# Patient Record
Sex: FEMALE | Race: WHITE | NOT HISPANIC OR LATINO | Employment: FULL TIME | URBAN - METROPOLITAN AREA
[De-identification: names, ages, dates, MRNs, and addresses within clinical notes are randomized per-mention and may not be internally consistent; named-entity substitution may affect disease eponyms.]

---

## 2017-02-03 RX ORDER — LABETALOL 200 MG/1
TABLET, FILM COATED ORAL
Qty: 60 TABLET | Refills: 0 | Status: SHIPPED | OUTPATIENT
Start: 2017-02-03 | End: 2017-03-09 | Stop reason: SDUPTHER

## 2017-03-09 RX ORDER — LABETALOL 200 MG/1
TABLET, FILM COATED ORAL
Qty: 30 TABLET | Refills: 0 | Status: SHIPPED | OUTPATIENT
Start: 2017-03-09 | End: 2017-04-18 | Stop reason: SDUPTHER

## 2017-03-30 RX ORDER — LABETALOL 200 MG/1
TABLET, FILM COATED ORAL
Qty: 30 TABLET | Refills: 0 | Status: SHIPPED | OUTPATIENT
Start: 2017-03-30 | End: 2017-04-17 | Stop reason: SDUPTHER

## 2017-04-17 RX ORDER — LABETALOL 200 MG/1
TABLET, FILM COATED ORAL
Qty: 30 TABLET | Refills: 0 | Status: SHIPPED | OUTPATIENT
Start: 2017-04-17 | End: 2017-04-18 | Stop reason: SDUPTHER

## 2017-04-18 RX ORDER — LABETALOL 200 MG/1
TABLET, FILM COATED ORAL
Qty: 21 TABLET | Refills: 0 | Status: SHIPPED | OUTPATIENT
Start: 2017-04-18 | End: 2017-05-01 | Stop reason: SDUPTHER

## 2017-05-01 RX ORDER — LABETALOL 200 MG/1
200 TABLET, FILM COATED ORAL 2 TIMES DAILY
Qty: 60 TABLET | Refills: 0 | Status: SHIPPED | OUTPATIENT
Start: 2017-05-01 | End: 2017-05-30 | Stop reason: SDUPTHER

## 2017-05-30 ENCOUNTER — OFFICE VISIT (OUTPATIENT)
Dept: FAMILY MEDICINE CLINIC | Facility: CLINIC | Age: 52
End: 2017-05-30

## 2017-05-30 VITALS
SYSTOLIC BLOOD PRESSURE: 160 MMHG | DIASTOLIC BLOOD PRESSURE: 98 MMHG | WEIGHT: 178 LBS | HEIGHT: 63 IN | TEMPERATURE: 98.3 F | HEART RATE: 77 BPM | BODY MASS INDEX: 31.54 KG/M2 | OXYGEN SATURATION: 98 %

## 2017-05-30 DIAGNOSIS — E87.6 HYPOKALEMIA: ICD-10-CM

## 2017-05-30 DIAGNOSIS — R79.89 LFT ELEVATION: ICD-10-CM

## 2017-05-30 DIAGNOSIS — I10 HYPERTENSION, ESSENTIAL: Primary | ICD-10-CM

## 2017-05-30 LAB
ALBUMIN SERPL-MCNC: 4.6 G/DL (ref 3.5–5.2)
ALBUMIN/GLOB SERPL: 1.5 G/DL
ALP SERPL-CCNC: 58 U/L (ref 39–117)
ALT SERPL W P-5'-P-CCNC: 20 U/L (ref 1–33)
ANION GAP SERPL CALCULATED.3IONS-SCNC: 15.6 MMOL/L
AST SERPL-CCNC: 44 U/L (ref 1–32)
BILIRUB SERPL-MCNC: 0.5 MG/DL (ref 0.1–1.2)
BUN BLD-MCNC: 12 MG/DL (ref 6–20)
BUN/CREAT SERPL: 16 (ref 7–25)
CALCIUM SPEC-SCNC: 9.9 MG/DL (ref 8.6–10.5)
CHLORIDE SERPL-SCNC: 102 MMOL/L (ref 98–107)
CO2 SERPL-SCNC: 22.4 MMOL/L (ref 22–29)
CREAT BLD-MCNC: 0.75 MG/DL (ref 0.57–1)
GFR SERPL CREATININE-BSD FRML MDRD: 81 ML/MIN/1.73
GLOBULIN UR ELPH-MCNC: 3.1 GM/DL
GLUCOSE BLD-MCNC: 100 MG/DL (ref 65–99)
HAV IGM SERPL QL IA: NORMAL
HBV CORE IGM SERPL QL IA: NORMAL
HBV SURFACE AG SERPL QL IA: NORMAL
HCV AB SER DONR QL: NORMAL
POTASSIUM BLD-SCNC: 4.4 MMOL/L (ref 3.5–5.2)
PROT SERPL-MCNC: 7.7 G/DL (ref 6–8.5)
SODIUM BLD-SCNC: 140 MMOL/L (ref 136–145)

## 2017-05-30 PROCEDURE — 99213 OFFICE O/P EST LOW 20 MIN: CPT | Performed by: INTERNAL MEDICINE

## 2017-05-30 PROCEDURE — 80074 ACUTE HEPATITIS PANEL: CPT | Performed by: INTERNAL MEDICINE

## 2017-05-30 PROCEDURE — 80053 COMPREHEN METABOLIC PANEL: CPT | Performed by: INTERNAL MEDICINE

## 2017-05-30 RX ORDER — NIFEDIPINE 30 MG/1
30 TABLET, EXTENDED RELEASE ORAL DAILY
Qty: 30 TABLET | Refills: 2 | Status: SHIPPED | OUTPATIENT
Start: 2017-05-30 | End: 2017-08-21 | Stop reason: SDUPTHER

## 2017-05-30 RX ORDER — LABETALOL 200 MG/1
200 TABLET, FILM COATED ORAL 2 TIMES DAILY
Qty: 180 TABLET | Refills: 3 | Status: SHIPPED | OUTPATIENT
Start: 2017-05-30 | End: 2018-04-14 | Stop reason: SDUPTHER

## 2017-08-21 RX ORDER — NIFEDIPINE 30 MG/1
TABLET, EXTENDED RELEASE ORAL
Qty: 30 TABLET | Refills: 0 | Status: SHIPPED | OUTPATIENT
Start: 2017-08-21 | End: 2017-09-15 | Stop reason: SDUPTHER

## 2017-09-15 RX ORDER — NIFEDIPINE 30 MG/1
TABLET, EXTENDED RELEASE ORAL
Qty: 30 TABLET | Refills: 0 | Status: SHIPPED | OUTPATIENT
Start: 2017-09-15 | End: 2017-10-09 | Stop reason: SDUPTHER

## 2017-10-09 RX ORDER — NIFEDIPINE 30 MG/1
TABLET, EXTENDED RELEASE ORAL
Qty: 30 TABLET | Refills: 0 | Status: SHIPPED | OUTPATIENT
Start: 2017-10-09 | End: 2017-11-06 | Stop reason: SDUPTHER

## 2017-11-06 RX ORDER — NIFEDIPINE 30 MG/1
TABLET, EXTENDED RELEASE ORAL
Qty: 30 TABLET | Refills: 0 | Status: SHIPPED | OUTPATIENT
Start: 2017-11-06 | End: 2017-11-30 | Stop reason: SDUPTHER

## 2017-11-30 RX ORDER — NIFEDIPINE 30 MG/1
TABLET, EXTENDED RELEASE ORAL
Qty: 30 TABLET | Refills: 0 | Status: SHIPPED | OUTPATIENT
Start: 2017-11-30 | End: 2017-12-30 | Stop reason: SDUPTHER

## 2018-01-02 RX ORDER — NIFEDIPINE 30 MG/1
TABLET, EXTENDED RELEASE ORAL
Qty: 30 TABLET | Refills: 0 | Status: SHIPPED | OUTPATIENT
Start: 2018-01-02 | End: 2018-02-05 | Stop reason: SDUPTHER

## 2018-02-05 RX ORDER — NIFEDIPINE 30 MG/1
TABLET, EXTENDED RELEASE ORAL
Qty: 30 TABLET | Refills: 0 | Status: SHIPPED | OUTPATIENT
Start: 2018-02-05 | End: 2018-10-17 | Stop reason: SDUPTHER

## 2018-04-16 RX ORDER — LABETALOL 200 MG/1
TABLET, FILM COATED ORAL
Qty: 180 TABLET | Refills: 0 | Status: SHIPPED | OUTPATIENT
Start: 2018-04-16 | End: 2018-07-12 | Stop reason: SDUPTHER

## 2018-07-12 RX ORDER — LABETALOL 200 MG/1
TABLET, FILM COATED ORAL
Qty: 180 TABLET | Refills: 0 | Status: SHIPPED | OUTPATIENT
Start: 2018-07-12 | End: 2018-10-05 | Stop reason: SDUPTHER

## 2018-10-05 RX ORDER — LABETALOL 200 MG/1
TABLET, FILM COATED ORAL
Qty: 14 TABLET | Refills: 0 | Status: SHIPPED | OUTPATIENT
Start: 2018-10-05 | End: 2018-10-15 | Stop reason: SDUPTHER

## 2018-10-15 ENCOUNTER — TELEPHONE (OUTPATIENT)
Dept: FAMILY MEDICINE CLINIC | Facility: CLINIC | Age: 53
End: 2018-10-15

## 2018-10-15 RX ORDER — LABETALOL 200 MG/1
200 TABLET, FILM COATED ORAL 2 TIMES DAILY
Qty: 30 TABLET | Refills: 0 | Status: SHIPPED | OUTPATIENT
Start: 2018-10-15 | End: 2018-10-17 | Stop reason: SDUPTHER

## 2018-10-15 NOTE — TELEPHONE ENCOUNTER
PT HAS AN APPOINTMENT TO [facility] THIS Wednesday AND SHE IS COMPLETELY OUT OF HER B/P MEDICATION. SHE IS ASKING IF SHE CAN JUST GET A COUPLE TABLETS WORTH SENT TO HER PHARMACY TO GET HER THROUGH UNTIL HER APPOINTMENT?

## 2018-10-17 ENCOUNTER — OFFICE VISIT (OUTPATIENT)
Dept: FAMILY MEDICINE CLINIC | Facility: CLINIC | Age: 53
End: 2018-10-17

## 2018-10-17 ENCOUNTER — TELEPHONE (OUTPATIENT)
Dept: FAMILY MEDICINE CLINIC | Facility: CLINIC | Age: 53
End: 2018-10-17

## 2018-10-17 VITALS
TEMPERATURE: 98.6 F | HEIGHT: 63 IN | HEART RATE: 70 BPM | WEIGHT: 171 LBS | BODY MASS INDEX: 30.3 KG/M2 | OXYGEN SATURATION: 98 % | SYSTOLIC BLOOD PRESSURE: 152 MMHG | DIASTOLIC BLOOD PRESSURE: 90 MMHG

## 2018-10-17 DIAGNOSIS — E66.09 CLASS 1 OBESITY DUE TO EXCESS CALORIES WITH SERIOUS COMORBIDITY AND BODY MASS INDEX (BMI) OF 30.0 TO 30.9 IN ADULT: ICD-10-CM

## 2018-10-17 DIAGNOSIS — I10 BENIGN ESSENTIAL HTN: Primary | ICD-10-CM

## 2018-10-17 DIAGNOSIS — Z72.0 TOBACCO ABUSE: ICD-10-CM

## 2018-10-17 DIAGNOSIS — Z12.39 BREAST CANCER SCREENING: ICD-10-CM

## 2018-10-17 DIAGNOSIS — R73.9 HYPERGLYCEMIA: Primary | ICD-10-CM

## 2018-10-17 DIAGNOSIS — R74.8 ELEVATED LIVER ENZYMES: ICD-10-CM

## 2018-10-17 DIAGNOSIS — Z12.11 COLON CANCER SCREENING: ICD-10-CM

## 2018-10-17 PROBLEM — R79.89 ELEVATED LFTS: Status: ACTIVE | Noted: 2017-03-28

## 2018-10-17 LAB
ALBUMIN SERPL-MCNC: 4.4 G/DL (ref 3.5–5.2)
ALBUMIN/GLOB SERPL: 1.4 G/DL
ALP SERPL-CCNC: 58 U/L (ref 39–117)
ALT SERPL W P-5'-P-CCNC: 60 U/L (ref 1–33)
ANION GAP SERPL CALCULATED.3IONS-SCNC: 15 MMOL/L
AST SERPL-CCNC: 76 U/L (ref 1–32)
BACTERIA UR QL AUTO: ABNORMAL /HPF
BILIRUB SERPL-MCNC: 0.4 MG/DL (ref 0.1–1.2)
BILIRUB UR QL STRIP: ABNORMAL
BUN BLD-MCNC: 6 MG/DL (ref 6–20)
BUN/CREAT SERPL: 9.8 (ref 7–25)
CALCIUM SPEC-SCNC: 9.8 MG/DL (ref 8.6–10.5)
CHLORIDE SERPL-SCNC: 101 MMOL/L (ref 98–107)
CHOLEST SERPL-MCNC: 259 MG/DL (ref 0–200)
CLARITY UR: CLEAR
CO2 SERPL-SCNC: 25 MMOL/L (ref 22–29)
COLOR UR: YELLOW
CREAT BLD-MCNC: 0.61 MG/DL (ref 0.57–1)
ERYTHROCYTE [DISTWIDTH] IN BLOOD BY AUTOMATED COUNT: 12.8 % (ref 4.5–15)
GFR SERPL CREATININE-BSD FRML MDRD: 102 ML/MIN/1.73
GLOBULIN UR ELPH-MCNC: 3.2 GM/DL
GLUCOSE BLD-MCNC: 114 MG/DL (ref 65–99)
GLUCOSE UR STRIP-MCNC: NEGATIVE MG/DL
HBA1C MFR BLD: 5.36 % (ref 4.8–5.6)
HCT VFR BLD AUTO: 43.6 % (ref 31–42)
HDLC SERPL-MCNC: 32 MG/DL (ref 40–60)
HGB BLD-MCNC: 14.3 G/DL (ref 12–18)
HGB UR QL STRIP.AUTO: NEGATIVE
KETONES UR QL STRIP: NEGATIVE
LDLC SERPL CALC-MCNC: 182 MG/DL (ref 0–100)
LDLC/HDLC SERPL: 5.68 {RATIO}
LEUKOCYTE ESTERASE UR QL STRIP.AUTO: ABNORMAL
LYMPHOCYTES # BLD AUTO: 1.8 10*3/MM3 (ref 1.2–3.4)
LYMPHOCYTES NFR BLD AUTO: 30.6 % (ref 21–51)
MCH RBC QN AUTO: 33.9 PG (ref 26.1–33.1)
MCHC RBC AUTO-ENTMCNC: 32.7 G/DL (ref 33–37)
MCV RBC AUTO: 103.7 FL (ref 80–99)
MONOCYTES # BLD AUTO: 0.4 10*3/MM3 (ref 0.1–0.6)
MONOCYTES NFR BLD AUTO: 6 % (ref 2–9)
NEUTROPHILS # BLD AUTO: 3.8 10*3/MM3 (ref 1.4–6.5)
NEUTROPHILS NFR BLD AUTO: 63.4 % (ref 42–75)
NITRITE UR QL STRIP: NEGATIVE
PH UR STRIP.AUTO: 6.5 [PH] (ref 4.6–8)
PLATELET # BLD AUTO: 249 10*3/MM3 (ref 150–450)
PMV BLD AUTO: 7.8 FL (ref 7.1–10.5)
POTASSIUM BLD-SCNC: 3.6 MMOL/L (ref 3.5–5.2)
PROT SERPL-MCNC: 7.6 G/DL (ref 6–8.5)
PROT UR QL STRIP: ABNORMAL
RBC # BLD AUTO: 4.21 10*6/MM3 (ref 4–6)
RBC # UR: ABNORMAL /HPF
REF LAB TEST METHOD: ABNORMAL
SODIUM BLD-SCNC: 141 MMOL/L (ref 136–145)
SP GR UR STRIP: 1.02 (ref 1–1.03)
SQUAMOUS #/AREA URNS HPF: ABNORMAL /HPF
TRIGL SERPL-MCNC: 226 MG/DL (ref 0–150)
TSH SERPL DL<=0.05 MIU/L-ACNC: 4.04 MIU/ML (ref 0.27–4.2)
UROBILINOGEN UR QL STRIP: ABNORMAL
VLDLC SERPL-MCNC: 45.2 MG/DL (ref 5–40)
WBC NRBC COR # BLD: 6 10*3/MM3 (ref 4.5–10)
WBC UR QL AUTO: ABNORMAL /HPF

## 2018-10-17 PROCEDURE — 81001 URINALYSIS AUTO W/SCOPE: CPT | Performed by: NURSE PRACTITIONER

## 2018-10-17 PROCEDURE — 83036 HEMOGLOBIN GLYCOSYLATED A1C: CPT | Performed by: NURSE PRACTITIONER

## 2018-10-17 PROCEDURE — 80061 LIPID PANEL: CPT | Performed by: NURSE PRACTITIONER

## 2018-10-17 PROCEDURE — 36415 COLL VENOUS BLD VENIPUNCTURE: CPT | Performed by: NURSE PRACTITIONER

## 2018-10-17 PROCEDURE — 80053 COMPREHEN METABOLIC PANEL: CPT | Performed by: NURSE PRACTITIONER

## 2018-10-17 PROCEDURE — 85025 COMPLETE CBC W/AUTO DIFF WBC: CPT | Performed by: NURSE PRACTITIONER

## 2018-10-17 PROCEDURE — 99214 OFFICE O/P EST MOD 30 MIN: CPT | Performed by: NURSE PRACTITIONER

## 2018-10-17 PROCEDURE — 84443 ASSAY THYROID STIM HORMONE: CPT | Performed by: NURSE PRACTITIONER

## 2018-10-17 RX ORDER — LABETALOL 200 MG/1
200 TABLET, FILM COATED ORAL 2 TIMES DAILY
Qty: 180 TABLET | Refills: 1 | Status: SHIPPED | OUTPATIENT
Start: 2018-10-17 | End: 2019-04-16 | Stop reason: SDUPTHER

## 2018-10-17 RX ORDER — NIFEDIPINE 30 MG/1
30 TABLET, EXTENDED RELEASE ORAL DAILY
Qty: 90 TABLET | Refills: 1 | Status: SHIPPED | OUTPATIENT
Start: 2018-10-17 | End: 2019-04-16 | Stop reason: SDUPTHER

## 2018-10-17 RX ORDER — ATORVASTATIN CALCIUM 10 MG/1
10 TABLET, FILM COATED ORAL NIGHTLY
Qty: 30 TABLET | Refills: 5 | Status: SHIPPED | OUTPATIENT
Start: 2018-10-17 | End: 2020-10-07 | Stop reason: HOSPADM

## 2018-10-17 RX ORDER — AMOXICILLIN 500 MG
CAPSULE ORAL
COMMUNITY

## 2018-10-17 NOTE — PATIENT INSTRUCTIONS
check labs and call with results, refill labetalol and add nifedipine XL 30 mg daily and call with BP log in 1-2 wks, enc smoking cessation, refer overdue colonoscopy and mammo, RTC [facility] annual CBE pap and pelvic here, Enc healthy diet and regular exercise for wt loss

## 2018-10-17 NOTE — TELEPHONE ENCOUNTER
BS elevated but no DM or prediabetes.Liver enzymes are elevated, we can recheck in 1 mo lab only and if still high consider liver US. Kidney and thyroid normal. Chol elevated and recommend lipitor 10 mg HS erx to [facility] and recheck chol fasting 3-6 mo. Enc smoking cessation and low chol diet and exercise

## 2018-10-17 NOTE — PROGRESS NOTES
Subjective   Charlette Silver is a 53 y.o. female.     History of Present Illness   Here to FU on chronic uncontrolled HTN on labetalol 200 mg BID and prev took nifedipine XL 30 mg daily but has been off, > 6 mo no CP dizziness HA LE edema, checks BP at home runs similar 140-150s/90s, with hx of low potassium now taking OTC, last labs 05/17 elevated ALT neg hepatitis panel, stopped drinking alcohol but still smoking 1/2 ppd but trying to decrease smoking and stop  Last mammo and pap many years ago, no breast pain, lumps, nipple dc, never had colonoscopy no bowel sx    The following portions of the patient's history were reviewed and updated as appropriate: allergies, current medications, past family history, past medical history, past social history, past surgical history and problem list.    Review of Systems   Constitutional: Negative for fever.   Respiratory: Negative for cough, shortness of breath and wheezing.    Cardiovascular: Negative for chest pain, palpitations and leg swelling.   Gastrointestinal: Negative for abdominal distention, abdominal pain, anal bleeding, blood in stool, constipation, diarrhea, nausea, rectal pain and vomiting.   Neurological: Negative for dizziness and headaches.   All other systems reviewed and are negative.      Objective   Physical Exam   Constitutional: She is oriented to person, place, and time. She appears well-developed and well-nourished.   HENT:   Head: Normocephalic and atraumatic.   Eyes: Pupils are equal, round, and reactive to light. Conjunctivae and EOM are normal.   Cardiovascular: Normal rate, regular rhythm and normal heart sounds.    Pulmonary/Chest: Effort normal and breath sounds normal.   Musculoskeletal: Normal range of motion. She exhibits no edema (B LE).   Neurological: She is alert and oriented to person, place, and time.   Skin: Skin is warm and dry.   Psychiatric: She has a normal mood and affect. Her behavior is normal. Judgment and thought content  normal.   Vitals reviewed.      Assessment/Plan   Charlette was seen today for med refill.    Diagnoses and all orders for this visit:    Benign essential HTN  -     CBC & Differential  -     Comprehensive Metabolic Panel  -     Lipid Panel  -     TSH  -     Urinalysis With Microscopic - Urine, Clean Catch  -     CBC Auto Differential  -     Urinalysis without microscopic (no culture) - Urine, Clean Catch  -     Urinalysis, Microscopic Only - Urine, Clean Catch    Tobacco abuse    Colon cancer screening  -     Ambulatory Referral to General Surgery    Breast cancer screening  -     Mammo Screening Bilateral With CAD; Future    Class 1 obesity due to excess calories with serious comorbidity and body mass index (BMI) of 30.0 to 30.9 in adult    Other orders  -     NIFEdipine XL (PROCARDIA XL) 30 MG 24 hr tablet; Take 1 tablet by mouth Daily.  -     labetalol (NORMODYNE) 200 MG tablet; Take 1 tablet by mouth 2 (Two) Times a Day.    check labs and call with results, refill labetalol and add nifedipine XL 30 mg daily and call with BP log in 1-2 wks, enc smoking cessation, refer overdue colonoscopy and mammo, RTC [facility] annual CBE pap and pelvic here, Enc healthy diet and regular exercise for wt loss

## 2019-04-17 RX ORDER — NIFEDIPINE 30 MG/1
30 TABLET, EXTENDED RELEASE ORAL DAILY
Qty: 90 TABLET | Refills: 0 | Status: SHIPPED | OUTPATIENT
Start: 2019-04-17 | End: 2020-10-07 | Stop reason: HOSPADM

## 2019-04-17 RX ORDER — LABETALOL 200 MG/1
TABLET, FILM COATED ORAL
Qty: 180 TABLET | Refills: 0 | Status: SHIPPED | OUTPATIENT
Start: 2019-04-17 | End: 2019-07-05 | Stop reason: SDUPTHER

## 2019-07-08 RX ORDER — LABETALOL 200 MG/1
TABLET, FILM COATED ORAL
Qty: 180 TABLET | Refills: 0 | Status: SHIPPED | OUTPATIENT
Start: 2019-07-08 | End: 2019-09-30 | Stop reason: SDUPTHER

## 2019-09-30 RX ORDER — LABETALOL 200 MG/1
TABLET, FILM COATED ORAL
Qty: 180 TABLET | Refills: 0 | Status: SHIPPED | OUTPATIENT
Start: 2019-09-30 | End: 2019-12-23

## 2019-12-23 RX ORDER — LABETALOL 200 MG/1
TABLET, FILM COATED ORAL
Qty: 180 TABLET | Refills: 0 | Status: SHIPPED | OUTPATIENT
Start: 2019-12-23 | End: 2020-04-10

## 2020-03-13 RX ORDER — LABETALOL 200 MG/1
TABLET, FILM COATED ORAL
Qty: 180 TABLET | Refills: 0 | OUTPATIENT
Start: 2020-03-13

## 2020-04-10 RX ORDER — LABETALOL 200 MG/1
TABLET, FILM COATED ORAL
Qty: 180 TABLET | Refills: 0 | Status: SHIPPED | OUTPATIENT
Start: 2020-04-10 | End: 2020-06-29

## 2020-06-29 RX ORDER — LABETALOL 200 MG/1
TABLET, FILM COATED ORAL
Qty: 180 TABLET | Refills: 0 | Status: SHIPPED | OUTPATIENT
Start: 2020-06-29 | End: 2020-10-07 | Stop reason: SDUPTHER

## 2020-09-24 ENCOUNTER — TELEPHONE (OUTPATIENT)
Dept: FAMILY MEDICINE CLINIC | Facility: CLINIC | Age: 55
End: 2020-09-24

## 2020-10-05 ENCOUNTER — TELEPHONE (OUTPATIENT)
Dept: FAMILY MEDICINE CLINIC | Facility: CLINIC | Age: 55
End: 2020-10-05

## 2020-10-07 ENCOUNTER — OFFICE VISIT (OUTPATIENT)
Dept: FAMILY MEDICINE CLINIC | Facility: CLINIC | Age: 55
End: 2020-10-07

## 2020-10-07 VITALS
HEIGHT: 63 IN | DIASTOLIC BLOOD PRESSURE: 84 MMHG | BODY MASS INDEX: 30.33 KG/M2 | HEART RATE: 88 BPM | TEMPERATURE: 97.5 F | WEIGHT: 171.2 LBS | SYSTOLIC BLOOD PRESSURE: 158 MMHG | OXYGEN SATURATION: 96 %

## 2020-10-07 DIAGNOSIS — Z72.0 TOBACCO ABUSE: ICD-10-CM

## 2020-10-07 DIAGNOSIS — E78.5 HYPERLIPIDEMIA, UNSPECIFIED HYPERLIPIDEMIA TYPE: ICD-10-CM

## 2020-10-07 DIAGNOSIS — I10 BENIGN ESSENTIAL HTN: Primary | ICD-10-CM

## 2020-10-07 LAB
ALBUMIN SERPL-MCNC: 4.3 G/DL (ref 3.5–5.2)
ALBUMIN/GLOB SERPL: 1.5 G/DL
ALP SERPL-CCNC: 69 U/L (ref 39–117)
ALT SERPL W P-5'-P-CCNC: 72 U/L (ref 1–33)
ANION GAP SERPL CALCULATED.3IONS-SCNC: 15.1 MMOL/L (ref 5–15)
AST SERPL-CCNC: 131 U/L (ref 1–32)
BILIRUB SERPL-MCNC: 0.5 MG/DL (ref 0–1.2)
BUN SERPL-MCNC: 6 MG/DL (ref 6–20)
BUN/CREAT SERPL: 10.5 (ref 7–25)
CALCIUM SPEC-SCNC: 9 MG/DL (ref 8.6–10.5)
CHLORIDE SERPL-SCNC: 102 MMOL/L (ref 98–107)
CHOLEST SERPL-MCNC: 244 MG/DL (ref 0–200)
CO2 SERPL-SCNC: 25.9 MMOL/L (ref 22–29)
CREAT SERPL-MCNC: 0.57 MG/DL (ref 0.57–1)
GFR SERPL CREATININE-BSD FRML MDRD: 110 ML/MIN/1.73
GLOBULIN UR ELPH-MCNC: 2.8 GM/DL
GLUCOSE SERPL-MCNC: 113 MG/DL (ref 65–99)
HDLC SERPL-MCNC: 34 MG/DL (ref 40–60)
LDLC SERPL CALC-MCNC: 161 MG/DL (ref 0–100)
LDLC/HDLC SERPL: 4.72 {RATIO}
POTASSIUM SERPL-SCNC: 3.5 MMOL/L (ref 3.5–5.2)
PROT SERPL-MCNC: 7.1 G/DL (ref 6–8.5)
SODIUM SERPL-SCNC: 143 MMOL/L (ref 136–145)
TRIGL SERPL-MCNC: 247 MG/DL (ref 0–150)
VLDLC SERPL-MCNC: 49.4 MG/DL (ref 5–40)

## 2020-10-07 PROCEDURE — 99213 OFFICE O/P EST LOW 20 MIN: CPT | Performed by: NURSE PRACTITIONER

## 2020-10-07 PROCEDURE — 80053 COMPREHEN METABOLIC PANEL: CPT | Performed by: NURSE PRACTITIONER

## 2020-10-07 PROCEDURE — 80061 LIPID PANEL: CPT | Performed by: NURSE PRACTITIONER

## 2020-10-07 PROCEDURE — 36415 COLL VENOUS BLD VENIPUNCTURE: CPT | Performed by: NURSE PRACTITIONER

## 2020-10-07 RX ORDER — LABETALOL 200 MG/1
200 TABLET, FILM COATED ORAL 2 TIMES DAILY
Qty: 180 TABLET | Refills: 3 | Status: SHIPPED | OUTPATIENT
Start: 2020-10-07 | End: 2021-08-26

## 2020-10-07 NOTE — PATIENT INSTRUCTIONS
check labs and call with results, check BP at home and call with log in 2 weeks may need to add amlodipine for improved BP control and more affordable, discussed concern for long term uncontrolled HTN or chol increased risk of CHF, MI or CVA, encourage smoking cessation, gave handout for free mammogram services through [facility] and [place], defer other preventative screenings d/t cost

## 2020-10-07 NOTE — PROGRESS NOTES
Subjective   Charlette Silver is a 55 y.o. female.     History of Present Illness   Here to  FU on chronic HTN on labetalol 200 mg BID, no CP dizziness HA LE edema, was prev taking nifedipine XL 30 mg but stopped d/t cost no insurance, not checking BP at home, with chronic chol on fish oil never started lipitor as recommended 10/18 last  d/t concern for cost, Smoker 1/2 ppd trying to decrease, last mammo > 5 years no breast pain, lumps nipple dc    The following portions of the patient's history were reviewed and updated as appropriate: allergies, current medications, past family history, past medical history, past social history, past surgical history and problem list.    Review of Systems   Constitutional: Negative for fever.   HENT: Negative for trouble swallowing and voice change.    Respiratory: Negative for cough, shortness of breath and wheezing.    Cardiovascular: Negative for chest pain, palpitations and leg swelling.   Gastrointestinal: Negative for abdominal distention, abdominal pain, anal bleeding, blood in stool, constipation, diarrhea, nausea, rectal pain and vomiting.   Endocrine: Positive for heat intolerance (attributes to hot flashes). Negative for cold intolerance, polydipsia, polyphagia and polyuria.   Neurological: Negative for dizziness and headaches.   All other systems reviewed and are negative.      Objective   Physical Exam  Vitals signs reviewed.   Constitutional:       Appearance: She is well-developed.   HENT:      Head: Normocephalic and atraumatic.   Eyes:      Conjunctiva/sclera: Conjunctivae normal.      Pupils: Pupils are equal, round, and reactive to light.   Neck:      Musculoskeletal: Normal range of motion.      Thyroid: No thyromegaly.   Cardiovascular:      Rate and Rhythm: Normal rate and regular rhythm.      Heart sounds: Normal heart sounds.   Pulmonary:      Effort: Pulmonary effort is normal.      Breath sounds: Normal breath sounds.   Abdominal:      General:  There is no distension.      Palpations: Abdomen is soft. There is no mass.      Tenderness: There is no abdominal tenderness. There is no right CVA tenderness, left CVA tenderness, guarding or rebound.      Hernia: No hernia is present.   Musculoskeletal: Normal range of motion.   Skin:     General: Skin is warm and dry.   Neurological:      Mental Status: She is alert and oriented to person, place, and time.   Psychiatric:         Mood and Affect: Mood normal.         Behavior: Behavior normal.         Thought Content: Thought content normal.         Judgment: Judgment normal.         Assessment/Plan   Charlette was seen today for f/u for refills, hypertension and hyperlipidemia.    Diagnoses and all orders for this visit:    Benign essential HTN  -     Comprehensive Metabolic Panel  -     Lipid Panel    Hyperlipidemia, unspecified hyperlipidemia type  -     Comprehensive Metabolic Panel  -     Lipid Panel    Tobacco abuse    Other orders  -     labetalol (NORMODYNE) 200 MG tablet; Take 1 tablet by mouth 2 (Two) Times a Day.    check labs and call with results, check BP at home and call with log in 2 weeks may need to add amlodipine for improved BP control and more affordable, discussed concern for long term uncontrolled HTN or chol increased risk of CHF, MI or CVA, encourage smoking cessation, gave handout for free mammogram services through [facility] and [place], defer other preventative screenings d/t cost

## 2020-10-09 DIAGNOSIS — R73.9 ELEVATED BLOOD SUGAR: ICD-10-CM

## 2020-10-09 DIAGNOSIS — R74.8 ELEVATED LIVER ENZYMES: Primary | ICD-10-CM

## 2020-10-09 RX ORDER — ATORVASTATIN CALCIUM 10 MG/1
10 TABLET, FILM COATED ORAL NIGHTLY
Qty: 30 TABLET | Refills: 5 | Status: SHIPPED | OUTPATIENT
Start: 2020-10-09 | End: 2022-01-18

## 2021-08-26 RX ORDER — LABETALOL 200 MG/1
TABLET, FILM COATED ORAL
Qty: 60 TABLET | Refills: 0 | Status: SHIPPED | OUTPATIENT
Start: 2021-08-26 | End: 2021-09-07 | Stop reason: SDUPTHER

## 2021-09-07 RX ORDER — LABETALOL 200 MG/1
200 TABLET, FILM COATED ORAL 2 TIMES DAILY
Qty: 60 TABLET | Refills: 4 | Status: SHIPPED | OUTPATIENT
Start: 2021-09-07 | End: 2021-11-29

## 2021-09-07 RX ORDER — LABETALOL 200 MG/1
200 TABLET, FILM COATED ORAL 2 TIMES DAILY
Qty: 60 TABLET | Refills: 2 | Status: SHIPPED | OUTPATIENT
Start: 2021-09-07 | End: 2022-02-22 | Stop reason: SDUPTHER

## 2021-09-07 NOTE — TELEPHONE ENCOUNTER
Caller: Charlette Silver    Relationship: Self    Best call back number:[phone]    Medication needed:   Requested Prescriptions     Pending Prescriptions Disp Refills   • labetalol (NORMODYNE) 200 MG tablet 60 tablet 0     Sig: Take 1 tablet by mouth 2 (Two) Times a Day.       When do you need the refill by: 9/7/21  What additional details did the patient provide when requesting the medication: CAN NOT GET FILLED AT [facility] NEEDS IT SENT TO [facility]     Does the patient have less than a 3 day supply:  [x] Yes  [] No    What is the patient's preferred pharmacy: [facility]/PHARMACY #[id number] - [place], [place] - [address] - [phone]  - [phone] FX

## 2021-10-14 ENCOUNTER — TELEPHONE (OUTPATIENT)
Dept: FAMILY MEDICINE CLINIC | Facility: CLINIC | Age: 56
End: 2021-10-14

## 2021-10-14 RX ORDER — NAPROXEN 500 MG/1
500 TABLET ORAL 2 TIMES DAILY WITH MEALS
Qty: 60 TABLET | Refills: 3 | Status: SHIPPED | OUTPATIENT
Start: 2021-10-14 | End: 2022-01-18

## 2021-10-14 NOTE — TELEPHONE ENCOUNTER
Caller: Charlette Silver    Relationship: Self    Best call back number: [phone]    What medication are you requesting: ANTIINFLAMMATORY     What are your current symptoms: SCIATIC NERVE FLARE UP    How long have you been experiencing symptoms: 6 WEEKS    Have you had these symptoms before:    [] Yes  [x] No    Have you been treated for these symptoms before:   [] Yes  [x] No    If a prescription is needed, what is your preferred pharmacy and phone number: [facility]/PHARMACY #[id number] - [place], [place] - [address] - [phone]  - [phone]      Additional notes: PATIENT HAS BEEN SEEING A CHIROPRACTOR FOR 4 WEEKS. SHE SAYS IT HAS HELPED SOME BUT SHE IS STILL HAVING PAIN AND SWELLING.    PLEASE CALL AND ADVISE

## 2021-11-29 RX ORDER — LABETALOL 200 MG/1
TABLET, FILM COATED ORAL
Qty: 180 TABLET | Refills: 1 | Status: SHIPPED | OUTPATIENT
Start: 2021-11-29 | End: 2022-01-18 | Stop reason: SDUPTHER

## 2022-01-18 ENCOUNTER — PATIENT ROUNDING (BHMG ONLY) (OUTPATIENT)
Dept: FAMILY MEDICINE CLINIC | Facility: CLINIC | Age: 57
End: 2022-01-18

## 2022-01-18 ENCOUNTER — OFFICE VISIT (OUTPATIENT)
Dept: FAMILY MEDICINE CLINIC | Facility: CLINIC | Age: 57
End: 2022-01-18

## 2022-01-18 VITALS
RESPIRATION RATE: 16 BRPM | SYSTOLIC BLOOD PRESSURE: 160 MMHG | DIASTOLIC BLOOD PRESSURE: 110 MMHG | BODY MASS INDEX: 29.41 KG/M2 | HEIGHT: 63 IN | WEIGHT: 166 LBS

## 2022-01-18 DIAGNOSIS — Z13.0 SCREENING FOR IRON DEFICIENCY ANEMIA: ICD-10-CM

## 2022-01-18 DIAGNOSIS — G89.29 CHRONIC LEFT-SIDED LOW BACK PAIN WITH LEFT-SIDED SCIATICA: ICD-10-CM

## 2022-01-18 DIAGNOSIS — I10 BENIGN ESSENTIAL HTN: Primary | ICD-10-CM

## 2022-01-18 DIAGNOSIS — M54.42 CHRONIC LEFT-SIDED LOW BACK PAIN WITH LEFT-SIDED SCIATICA: ICD-10-CM

## 2022-01-18 PROCEDURE — 99214 OFFICE O/P EST MOD 30 MIN: CPT | Performed by: INTERNAL MEDICINE

## 2022-01-18 RX ORDER — LISINOPRIL 10 MG/1
10 TABLET ORAL DAILY
Qty: 30 TABLET | Refills: 0 | Status: SHIPPED | OUTPATIENT
Start: 2022-01-18 | End: 2022-02-09

## 2022-01-18 RX ORDER — CYCLOBENZAPRINE HCL 10 MG
10 TABLET ORAL
Qty: 10 TABLET | Refills: 0 | Status: SHIPPED | OUTPATIENT
Start: 2022-01-18 | End: 2022-01-28

## 2022-01-18 RX ORDER — UBIDECARENONE 75 MG
50 CAPSULE ORAL DAILY
COMMUNITY

## 2022-01-18 RX ORDER — NAPROXEN 375 MG/1
TABLET ORAL
Qty: 20 TABLET | Refills: 1 | Status: SHIPPED | OUTPATIENT
Start: 2022-01-18 | End: 2022-07-18

## 2022-01-18 RX ORDER — VITAMIN E 268 MG
400 CAPSULE ORAL DAILY
COMMUNITY
End: 2022-07-18

## 2022-01-18 NOTE — PROGRESS NOTES
2022    CC: Back Pain (EST CARE)  .        HPI  History of Present Illness     Subjective   Charlette Silver is a 56 y.o. female.      The following portions of the patient's history were reviewed and updated as appropriate: allergies, current medications, past family history, past medical history, past social history, past surgical history and problem list.    Problem List  Patient Active Problem List   Diagnosis   • Benign essential HTN   • Elevated LFTs       Past Medical History  Past Medical History:   Diagnosis Date   • Hypertension        Surgical History  History reviewed. No pertinent surgical history.    Family History  Family History   Adopted: Yes       Social History  Social History    Tobacco Use      Smoking status: Former Smoker        Packs/day: 0.50        Years: 7.00        Pack years: 3.5        Types: Cigarettes        Quit date:         Years since quittin.0      Smokeless tobacco: Never Used      Tobacco comment: quit 3 mo ago       Is the Patient a current tobacco user? No    Allergies  No Known Allergies    Current Medications    Current Outpatient Medications:   •  labetalol (NORMODYNE) 200 MG tablet, Take 1 tablet by mouth 2 (Two) Times a Day., Disp: 60 tablet, Rfl: 2  •  Omega-3 Fatty Acids (FISH OIL) 1200 MG capsule capsule, Take  by mouth., Disp: , Rfl:   •  vitamin B-12 (CYANOCOBALAMIN) 100 MCG tablet, Take 50 mcg by mouth Daily., Disp: , Rfl:   •  vitamin E 400 UNIT capsule, Take 400 Units by mouth Daily., Disp: , Rfl:   •  cyclobenzaprine (FLEXERIL) 10 MG tablet, Take 1 tablet by mouth every night at bedtime for 10 days., Disp: 10 tablet, Rfl: 0  •  naproxen (NAPROSYN) 375 MG tablet, Take 1 twice a day with food, Disp: 20 tablet, Rfl: 1     Review of System  Review of Systems   Respiratory: Negative.    Cardiovascular: Negative.    Gastrointestinal: Negative.    Musculoskeletal: Positive for back pain and gait problem.        Patient walks slowly, gingerly favoring the  left side     I have reviewed and confirmed the accuracy of the ROS as documented by the MA/LPN/RN Isai Kumari MD    Vitals:    01/18/22 0813   BP: (!) 160/110   Resp: 16     Body mass index is 29.41 kg/m².    Objective     Physical Exam  Physical Exam  HENT:      Head: Normocephalic.      Nose: Nose normal.   Cardiovascular:      Rate and Rhythm: Normal rate and regular rhythm.      Pulses: Normal pulses.      Heart sounds: Normal heart sounds.   Pulmonary:      Effort: Pulmonary effort is normal.      Breath sounds: Normal breath sounds.   Neurological:      General: No focal deficit present.      Mental Status: She is oriented to person, place, and time.      Gait: Gait abnormal.      Deep Tendon Reflexes: Reflexes abnormal.      Comments: Decreased patellar reflex on the left  Mild tenderness in the left lumbar sacral area.         Assessment/Plan    This 56-year-old patient presents at this time to get established with his primary care.  She has been seen in the past by Dr. Vu internal medicine but he has since moved from the neighborhood.  She relates that because of persistent back pain she was seeing a chiropractor Noah gonzalez 3 days a week for the past 3 months but was not seeing much improvement in her pain.  She relates that Dr. Vu prescribed some Naprosyn for her for back pain also but this was not effective.  She also has a history of hypertension and currently on the labetalol 200 mg 1 tab p.o. twice daily.  Blood pressure initially was 160/110 on recheck by me in the left arm sitting position standard cuff was 150/100.  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  She also has several dogs and cats at home and has to lift litter boxes lift animals etc. which involves much bending etc.  She denies any recent motor vehicle accidents or slip or fall problems.              Diagnoses and all orders for this visit:    1. Benign essential HTN  (Primary)  -     Comprehensive Metabolic Panel    2. Screening for iron deficiency anemia  -     CBC & Differential    Other orders  -     cyclobenzaprine (FLEXERIL) 10 MG tablet; Take 1 tablet by mouth every night at bedtime for 10 days.  Dispense: 10 tablet; Refill: 0  -     naproxen (NAPROSYN) 375 MG tablet; Take 1 twice a day with food  Dispense: 20 tablet; Refill: 1      Plan:  1.)  Flexeril 10 mg 1 tab p.o. nightly to start  2.)  Naprosyn 375 mg 1 tab p.o. twice daily with food  3.)  Follow-up in 10 days  4.)  Comprehensive metabolic profile, CBC,  5.)  Continue labetalol 200 mg 1 tab p.o. twice daily for now.  6.)  Lisinopril 10 mg 1 tab p.o. every morning for hypertension       Isai Kumari MD  01/18/2022

## 2022-01-18 NOTE — PROGRESS NOTES
January 18, 2022    Hello, may I speak with Charlette Silver?    My name is     I am  with [facility]  Formerly named [facility] [address] [phone].    Before we get started may I verify your date of birth? 1965    I am calling to officially welcome you to our practice and ask about your recent visit. Is this a good time to talk? My Chart Message    Tell me about your visit with us. What things went well?       We're always looking for ways to make our patients' experiences even better. Do you have recommendations on ways we may improve?      Overall were you satisfied with your first visit to our practice?      I appreciate you taking the time to speak with me today. Is there anything else I can do for you?     Thank you, and have a great day.

## 2022-02-09 RX ORDER — LISINOPRIL 10 MG/1
TABLET ORAL
Qty: 30 TABLET | Refills: 0 | Status: SHIPPED | OUTPATIENT
Start: 2022-02-09 | End: 2022-03-21

## 2022-02-22 RX ORDER — LABETALOL 200 MG/1
200 TABLET, FILM COATED ORAL 2 TIMES DAILY
Qty: 60 TABLET | Refills: 2 | Status: SHIPPED | OUTPATIENT
Start: 2022-02-22 | End: 2022-05-18

## 2022-02-22 NOTE — TELEPHONE ENCOUNTER
Caller: Charlette Silver    Relationship: Self    Best call back number: [phone]    Requested Prescriptions:       labetalol (NORMODYNE) 200 MG tablet     Pharmacy where request should be sent:    [facility]/pharmacy #[id number] - [place], [place] - [address] - [phone]  - [phone] FX  [phone]      Does the patient have less than a 3 day supply:  [x] Yes  [] No    Aspen De La Cruz, RegSched Rep   02/22/22 12:00 EST     PLEASE ADVISE.

## 2022-03-21 RX ORDER — LISINOPRIL 10 MG/1
TABLET ORAL
Qty: 30 TABLET | Refills: 0 | Status: SHIPPED | OUTPATIENT
Start: 2022-03-21 | End: 2022-04-04 | Stop reason: SDUPTHER

## 2022-03-21 NOTE — TELEPHONE ENCOUNTER
Rx Refill Note  Requested Prescriptions     Pending Prescriptions Disp Refills   • lisinopril (PRINIVIL,ZESTRIL) 10 MG tablet [Pharmacy Med Name: LISINOPRIL 10 MG TABLET] 30 tablet 0     Sig: TAKE 1 TABLET BY MOUTH EVERY DAY      Last office visit with prescribing clinician: 1/18/2022      Next office visit with prescribing clinician: Visit date not found            Federico Craig MA  03/21/22, 08:23 EDT

## 2022-04-04 RX ORDER — LISINOPRIL 10 MG/1
10 TABLET ORAL DAILY
Qty: 30 TABLET | Refills: 5 | Status: SHIPPED | OUTPATIENT
Start: 2022-04-04

## 2022-04-04 NOTE — TELEPHONE ENCOUNTER
Rx Refill Note  Requested Prescriptions     Pending Prescriptions Disp Refills   • lisinopril (PRINIVIL,ZESTRIL) 10 MG tablet 30 tablet 0     Sig: Take 1 tablet by mouth Daily.      Last office visit with prescribing clinician: 1/18/2022      Next office visit with prescribing clinician: Visit date not found            Federico Craig MA  04/04/22, 11:33 EDT

## 2022-05-18 RX ORDER — LABETALOL 200 MG/1
TABLET, FILM COATED ORAL
Qty: 180 TABLET | Refills: 4 | Status: SHIPPED | OUTPATIENT
Start: 2022-05-18

## 2022-05-18 NOTE — TELEPHONE ENCOUNTER
Rx Refill Note  Requested Prescriptions     Pending Prescriptions Disp Refills   • labetalol (NORMODYNE) 200 MG tablet [Pharmacy Med Name: LABETALOL  MG TABLET] 180 tablet      Sig: TAKE 1 TABLET BY MOUTH TWICE A DAY      Last office visit with prescribing clinician: 1/18/2022      Next office visit with prescribing clinician: Visit date not found            Rafaela Levy MA  05/18/22, 16:11 EDT

## 2022-07-18 ENCOUNTER — OFFICE VISIT (OUTPATIENT)
Dept: FAMILY MEDICINE CLINIC | Facility: CLINIC | Age: 57
End: 2022-07-18

## 2022-07-18 VITALS
BODY MASS INDEX: 28.17 KG/M2 | RESPIRATION RATE: 16 BRPM | DIASTOLIC BLOOD PRESSURE: 118 MMHG | SYSTOLIC BLOOD PRESSURE: 150 MMHG | HEIGHT: 63 IN | WEIGHT: 159 LBS

## 2022-07-18 DIAGNOSIS — G89.29 CHRONIC LEFT-SIDED LOW BACK PAIN WITH LEFT-SIDED SCIATICA: Primary | ICD-10-CM

## 2022-07-18 DIAGNOSIS — M54.42 CHRONIC LEFT-SIDED LOW BACK PAIN WITH LEFT-SIDED SCIATICA: Primary | ICD-10-CM

## 2022-07-18 PROCEDURE — 99214 OFFICE O/P EST MOD 30 MIN: CPT | Performed by: INTERNAL MEDICINE

## 2022-07-20 ENCOUNTER — TELEPHONE (OUTPATIENT)
Dept: FAMILY MEDICINE CLINIC | Facility: CLINIC | Age: 57
End: 2022-07-20

## 2022-07-20 RX ORDER — NAPROXEN 500 MG/1
500 TABLET ORAL 2 TIMES DAILY WITH MEALS
Qty: 40 TABLET | Refills: 1 | Status: SHIPPED | OUTPATIENT
Start: 2022-07-20

## 2022-07-20 RX ORDER — CYCLOBENZAPRINE HCL 10 MG
TABLET ORAL
Qty: 10 TABLET | Refills: 0 | Status: SHIPPED | OUTPATIENT
Start: 2022-07-20

## 2022-07-20 NOTE — TELEPHONE ENCOUNTER
Caller: Charlette Silver    Relationship: Self    Best call back number: [phone]    What was the call regarding: PATIENT WAS IN LATE ON 7/18 AND WAS SUPPOSE TO HAVE TWO RX SENT TO PHARMACY BELOW. THEY HAVE NOT RECEIVED ANYTHING AND PATIENT IS NOT SURE THE NAMES OF THE MEDICATIONS.     SHE IS ALSO NEEDING TO KNOW IF MRI HAD BEEN ORDERED.    Do you require a callback: YES    [facility]/pharm[facility] #[id number] - [place], [place] - [address] - [phone] [facility] [phone]   [phone]

## 2022-07-27 NOTE — PROGRESS NOTES
2022    CC: Mass (On right wrist and forearm.  Shrinking.) and Sciatica (Left side)  .        HPI  This 56-year-old patient presents today with several medical problems.  She was last seen by us about 6 months ago with complaint of back pain she was seen by the chiropractor in December for preceding the visit.       Subjective   Charlette Silver is a 56 y.o. female.      The following portions of the patient's history were reviewed and updated as appropriate: allergies, current medications, past family history, past medical history, past social history, past surgical history and problem list.    Problem List  Patient Active Problem List   Diagnosis   • Benign essential HTN   • Elevated LFTs       Past Medical History  Past Medical History:   Diagnosis Date   • Hypertension        Surgical History  History reviewed. No pertinent surgical history.    Family History  Family History   Adopted: Yes       Social History  Social History    Tobacco Use      Smoking status: Former Smoker        Packs/day: 0.50        Years: 7.00        Pack years: 3.5        Types: Cigarettes        Quit date:         Years since quittin.5      Smokeless tobacco: Never Used      Tobacco comment: quit 3 mo ago       Is the Patient a current tobacco user? No    Allergies  No Known Allergies    Current Medications    Current Outpatient Medications:   •  labetalol (NORMODYNE) 200 MG tablet, TAKE 1 TABLET BY MOUTH TWICE A DAY, Disp: 180 tablet, Rfl: 4  •  Omega-3 Fatty Acids (FISH OIL) 1200 MG capsule capsule, Take  by mouth., Disp: , Rfl:   •  vitamin B-12 (CYANOCOBALAMIN) 100 MCG tablet, Take 50 mcg by mouth Daily., Disp: , Rfl:   •  cyclobenzaprine (FLEXERIL) 10 MG tablet, Take 1 tablet p.o. nightly x5 days.  May increase to 1 twice daily if no improvement after 3 days., Disp: 10 tablet, Rfl: 0  •  lisinopril (PRINIVIL,ZESTRIL) 10 MG tablet, Take 1 tablet by mouth Daily., Disp: 30 tablet, Rfl: 5  •  naproxen (Naprosyn) 500 MG  tablet, Take 1 tablet by mouth 2 (Two) Times a Day With Meals., Disp: 40 tablet, Rfl: 1     Review of System  Review of Systems   Eyes: Negative.    Respiratory: Negative.    Cardiovascular: Negative.    Gastrointestinal: Negative.    Musculoskeletal:        Patient complains of a small cyst on her volar surface of the right wrist.  She relates is been present for about 2 to 3 weeks it increases in size and decreases.   Skin: Negative.      I have reviewed and confirmed the accuracy of the ROS as documented by the MA/LPN/RN Isai Kumari MD    Vitals:    07/18/22 1601   BP: (!) 150/118   Resp: 16     Body mass index is 28.17 kg/m².    Objective     Physical Exam  Physical Exam  HENT:      Head: Normocephalic.      Nose: Nose normal.   Eyes:      Extraocular Movements: Extraocular movements intact.      Pupils: Pupils are equal, round, and reactive to light.   Pulmonary:      Effort: Pulmonary effort is normal.      Breath sounds: Normal breath sounds.   Musculoskeletal:         General: Normal range of motion.      Comments: 3 cm cyst seen on the volar surface of the right wrist.  It is minimally tender.  Range of motion of the wrist is entirely within normal limits.   Neurological:      General: No focal deficit present.      Mental Status: She is oriented to person, place, and time.         Assessment & Plan    This patient with a BMI of 28 8.2 relates that she has had chronic back Pain for a number of years.  She was last being followed by chiropractor back in December 2021.  We saw her in in January.  She relates that she has developed some numbness in the left leg note is made the straight leg raises were negative.  Patient is able to touch her toes and had complete range of motion without any pain at the waist.  There is no evidence of any lumbosacral spasm.    Blood pressure was initially elevated at 150/118 on recheck by me in the left arm sitting position standard cuff it it decreased to she is  currently taking Normodyne 200 mg 1 tab p.o. twice daily and lisinopril 10 mg p.o. daily for her blood pressure control.  There is concerned also for high sodium meal taken the night before.      Sensorimotor nerves were intact in the lower extremities bilaterally.  Two-point discrimination was normal.    Diagnoses and all orders for this visit:    1. Chronic left-sided low back pain with left-sided sciatica (Primary)  -     MRI Lumbar Spine Without Contrast; Future    Other orders  -     cyclobenzaprine (FLEXERIL) 10 MG tablet; Take 1 tablet p.o. nightly x5 days.  May increase to 1 twice daily if no improvement after 3 days.  Dispense: 10 tablet; Refill: 0  -     naproxen (Naprosyn) 500 MG tablet; Take 1 tablet by mouth 2 (Two) Times a Day With Meals.  Dispense: 40 tablet; Refill: 1         Plan:  1.)  Flexeril 10 mg 1 tab p.o. nightly x5 days  2.)  Naprosyn 375 mg 1 tab p.o. twice daily with food  4.)  Follow-up in 3 to 4 weeks  5.)  MRI of the lumbosacral spine to evaluate her longstanding complaints of back pain and her recent complaint of tingling in the left lower extremity.    Isai Kumari MD  07/18/2022

## 2022-08-19 ENCOUNTER — TELEPHONE (OUTPATIENT)
Dept: FAMILY MEDICINE CLINIC | Facility: CLINIC | Age: 57
End: 2022-08-19

## 2022-08-19 NOTE — TELEPHONE ENCOUNTER
Caller: Charlette Silver    Relationship: Self    Best call back number: [phone]    What is the best time to reach you: ANYTIME    Who are you requesting to speak with (clinical staff, provider,  specific staff member): DR. COOLEY    What was the call regarding: PATIENT IS WANTING TO KNOW IF DR. COOLEY CAN SQUEEZE HER IN FOR AN APPOINTMENT TO FOLLOW UP WITH HER MRI RESULTS.     PATIENT STATES SHE IS HAVING HER MRI DONE ON MONDAY, 08/22/2022.     THE NEXT AVAILABLE APPOINTMENT WITH DR. COOLEY IS IN October 2022.

## 2022-08-22 ENCOUNTER — HOSPITAL ENCOUNTER (OUTPATIENT)
Dept: MRI IMAGING | Facility: HOSPITAL | Age: 57
Discharge: HOME | End: 2022-08-22
Admitting: INTERNAL MEDICINE

## 2022-08-22 DIAGNOSIS — M54.42 CHRONIC LEFT-SIDED LOW BACK PAIN WITH LEFT-SIDED SCIATICA: ICD-10-CM

## 2022-08-22 DIAGNOSIS — G89.29 CHRONIC LEFT-SIDED LOW BACK PAIN WITH LEFT-SIDED SCIATICA: ICD-10-CM

## 2022-08-22 PROCEDURE — 72148 MRI LUMBAR SPINE W/O DYE: CPT

## 2022-08-23 DIAGNOSIS — G89.29 CHRONIC BACK PAIN, UNSPECIFIED BACK LOCATION, UNSPECIFIED BACK PAIN LATERALITY: Primary | ICD-10-CM

## 2022-08-23 DIAGNOSIS — M54.9 CHRONIC BACK PAIN, UNSPECIFIED BACK LOCATION, UNSPECIFIED BACK PAIN LATERALITY: Primary | ICD-10-CM

## 2022-09-06 NOTE — PROGRESS NOTES
Patient ID: Charlette Silver is a 56 y.o. female is being seen for consultation today at the request of Isai Kumari MD.  Patient complains for low back pain.  MRI was completed 8/22/22 at [facility].      Today, Ms. Silver report constant back pain that began September of 2021. She reports left leg pain with numbness. She denies any loss of bowel/bladder control. She has tried chiropractic care. She has also tried oral steroids.     Subjective     The patient is here in regards to   Chief Complaint   Patient presents with   • Back Pain       Charlette presents with a 1 year history of back pain and multiyear history of leg pain that has been intermittently getting better and worse over time.  She notes that she previously had a serious and noticeable foot drop on her left side that is spontaneously gotten better.  She has not tried any LESI or physical therapy but she has tried over-the-counter medications and chiropractor.  She says that her leg pain bothers her more than her back pain at the moment.  She also notes instability and difficulty ambulating.  She smoked for approximately 7 years but quit in 2021.      While in the room and during my examination of the patient I wore a mask and eye protection.  I washed my hands before and after this patient encounter.  The patient was also wearing a mask.    The following portions of the patient's history were reviewed and updated as appropriate: allergies, current medications, past family history, past medical history, past social history, past surgical history and problem list.    Review of Systems   Constitutional: Positive for activity change.   HENT: Negative for congestion.    Eyes: Negative for visual disturbance.   Respiratory: Negative for chest tightness and shortness of breath.    Cardiovascular: Negative for chest pain.   Gastrointestinal: Negative for nausea.   Endocrine: Negative for cold intolerance and heat intolerance.   Genitourinary: Negative  for difficulty urinating.   Musculoskeletal: Positive for back pain.   Skin: Negative for wound.   Allergic/Immunologic: Negative for environmental allergies.   Neurological: Negative for dizziness.   Hematological: Bruises/bleeds easily.   Psychiatric/Behavioral: Positive for sleep disturbance.        Past Medical History:   Diagnosis Date   • Hypertension        No Known Allergies    Family History   Adopted: Yes       Social History     Socioeconomic History   • Marital status: Single   Tobacco Use   • Smoking status: Former Smoker     Packs/day: 0.50     Years: 7.00     Pack years: 3.50     Types: Cigarettes     Quit date:      Years since quittin.6   • Smokeless tobacco: Never Used   • Tobacco comment: quit 3 mo ago   Substance and Sexual Activity   • Alcohol use: Yes     Comment: daily   • Drug use: No       History reviewed. No pertinent surgical history.      Objective     Vitals:    22 1047   BP: 162/92   Pulse: 71   Temp: 96.8 °F (36 °C)   SpO2: 99%     Body mass index is 28.17 kg/m².    Physical Exam  Constitutional:       Appearance: Normal appearance.   HENT:      Head: Normocephalic and atraumatic.   Eyes:      Extraocular Movements: Extraocular movements intact.      Conjunctiva/sclera: Conjunctivae normal.      Pupils: Pupils are equal, round, and reactive to light.   Cardiovascular:      Rate and Rhythm: Normal rate and regular rhythm.      Pulses: Normal pulses.   Pulmonary:      Breath sounds: Normal breath sounds.   Abdominal:      Palpations: Abdomen is soft.   Musculoskeletal:         General: Normal range of motion.      Cervical back: Normal range of motion and neck supple.      Comments: Left-sided SI joint inflammation with positive Prabhjot's and Cj's sign   Skin:     General: Skin is warm and dry.   Neurological:      Mental Status: She is alert and oriented to person, place, and time.      Cranial Nerves: Cranial nerves are intact.      Motor: Motor function is intact.  No weakness or atrophy.      Coordination: Coordination is intact. Romberg sign negative. Romberg Test abnormal.      Gait: Gait is intact. Gait normal.      Deep Tendon Reflexes:      Reflex Scores:       Tricep reflexes are 2+ on the right side and 2+ on the left side.       Bicep reflexes are 2+ on the right side and 2+ on the left side.       Brachioradialis reflexes are 2+ on the right side and 2+ on the left side.       Patellar reflexes are 2+ on the right side and 1+ on the left side.       Achilles reflexes are 2+ on the right side and 2+ on the left side.  Psychiatric:         Speech: Speech normal.         Neurologic Exam     Mental Status   Oriented to person, place, and time.   Attention: normal. Concentration: normal.   Speech: speech is normal   Level of consciousness: alert    Cranial Nerves   Cranial nerves II through XII intact.     CN III, IV, VI   Pupils are equal, round, and reactive to light.    Motor Exam   Muscle bulk: normal  Overall muscle tone: normal    Strength   Strength 5/5 except as noted.   Left anterior tibial: 4/5EHL 4+ out of 5 on the left     Sensory Exam   Light touch normal.     Gait, Coordination, and Reflexes     Gait  Gait: normal    Coordination   Romberg: positive    Reflexes   Reflexes 2+ except as noted.   Right brachioradialis: 2+  Left brachioradialis: 2+  Right biceps: 2+  Left biceps: 2+  Right triceps: 2+  Left triceps: 2+  Right patellar: 2+  Left patellar: 1+  Right achilles: 2+  Left achilles: 2+  Right Meza: present  Left Meza: present      Assessment & Plan   Independent Review of Radiographic Studies:      I personally reviewed the images from the following studies.    MR: MRI of the lumbar spine wo contrast was reviewed and shows Multiple levels of degenerative disease and disc bulge from L1-S1.  There is severe canal stenosis at L3-4 and L4-5 due to lipomatosis, disc bulge at, posterior facet hypertrophy at L3-4 and grade 1 spondylolisthesis at L4-5  this is causing compression of the L3, L4, L5 nerve roots that are worse on the left than the right.    Assessment/Plan: I discussed with Charlette our findings on history and exam and went over the imaging with her.  She does have lumbar radiculopathy from severe canal, lateral recess and foraminal stenosis on the left side.  The severity of this issue is severe given that she already has a foot drop, however I am concerned about her abnormal reflexes and the possibility that she has stenosis causing myelopathy at the cervical spine level as well.  I would like to first obtain an MRI of her cervical spine and address that more pressing issue prior to her lumbar spine issues.  She also does have some SI joint inflammation causing low back pain but she says this is not one of her major problems right now and so we will address that at a later date as well.    Medical Decision Making:      MRI of the cervical spine without contrast  Follow-up in clinic in 2 weeks.         Diagnoses and all orders for this visit:    1. Cervical spondylosis with myelopathy (Primary)  -     MRI Cervical Spine Without Contrast; Future    2. Lumbar spondylitis (HCC)    3. Lumbar radiculopathy, chronic               Ivan Vargas MD  09/07/22  11:28 EDT

## 2022-09-07 ENCOUNTER — OFFICE VISIT (OUTPATIENT)
Dept: NEUROSURGERY | Facility: CLINIC | Age: 57
End: 2022-09-07

## 2022-09-07 VITALS
DIASTOLIC BLOOD PRESSURE: 92 MMHG | HEART RATE: 71 BPM | HEIGHT: 63 IN | OXYGEN SATURATION: 99 % | TEMPERATURE: 96.8 F | SYSTOLIC BLOOD PRESSURE: 162 MMHG | BODY MASS INDEX: 28.17 KG/M2 | WEIGHT: 159 LBS

## 2022-09-07 DIAGNOSIS — M46.1 SI (SACROILIAC) JOINT INFLAMMATION: ICD-10-CM

## 2022-09-07 DIAGNOSIS — M46.96 LUMBAR SPONDYLITIS: ICD-10-CM

## 2022-09-07 DIAGNOSIS — M54.16 LUMBAR RADICULOPATHY, CHRONIC: ICD-10-CM

## 2022-09-07 DIAGNOSIS — M47.12 CERVICAL SPONDYLOSIS WITH MYELOPATHY: Primary | ICD-10-CM

## 2022-09-07 PROCEDURE — 99204 OFFICE O/P NEW MOD 45 MIN: CPT | Performed by: NEUROLOGICAL SURGERY

## 2022-09-09 ENCOUNTER — PATIENT ROUNDING (BHMG ONLY) (OUTPATIENT)
Dept: NEUROSURGERY | Facility: CLINIC | Age: 57
End: 2022-09-09

## 2022-10-05 ENCOUNTER — TELEPHONE (OUTPATIENT)
Dept: NEUROSURGERY | Facility: CLINIC | Age: 57
End: 2022-10-05

## 2022-10-05 NOTE — TELEPHONE ENCOUNTER
Called PT and left VM to give us a call back at the office. MRI department has been trying to reach PT several times and there is no communication. I left a VM for PT to give us a call back in regards to the MRI and scheduling a f/u with .

## 2022-10-10 ENCOUNTER — TELEPHONE (OUTPATIENT)
Dept: NEUROSURGERY | Facility: CLINIC | Age: 57
End: 2022-10-10

## 2022-10-10 NOTE — TELEPHONE ENCOUNTER
Called PT and left a VM. I called last week on 10/5/022. MRI has not been able to get a hold of the PT and I cannot either. Left VM for PT to return my phone call we can schedule her appt with MRI and a f/u with Dr. Vargas.

## 2022-10-11 ENCOUNTER — TELEPHONE (OUTPATIENT)
Dept: NEUROSURGERY | Facility: CLINIC | Age: 57
End: 2022-10-11

## 2022-10-11 NOTE — TELEPHONE ENCOUNTER
Called PT for the third time. I called 10/5/2022, 10/10/2022, and 10/11/2022 to schedule PT MRI. Cannot get a hold of PT. Closing referral.

## 2022-10-12 ENCOUNTER — TELEPHONE (OUTPATIENT)
Dept: FAMILY MEDICINE CLINIC | Facility: CLINIC | Age: 57
End: 2022-10-12

## 2023-09-14 RX ORDER — LISINOPRIL 10 MG/1
TABLET ORAL
Qty: 90 TABLET | Refills: 1 | Status: SHIPPED | OUTPATIENT
Start: 2023-09-14

## 2024-08-29 NOTE — TELEPHONE ENCOUNTER
Prior to immunization administration, verified patients identity using patient s name and date of birth. Please see Immunization Activity for additional information.     Screening Questionnaire for Adult Immunization    Are you sick today?   No   Do you have allergies to medications, food, a vaccine component or latex?   No   Have you ever had a serious reaction after receiving a vaccination?   No   Do you have a long-term health problem with heart, lung, kidney, or metabolic disease (e.g., diabetes), asthma, a blood disorder, no spleen, complement component deficiency, a cochlear implant, or a spinal fluid leak?  Are you on long-term aspirin therapy?   No   Do you have cancer, leukemia, HIV/AIDS, or any other immune system problem?   No   Do you have a parent, brother, or sister with an immune system problem?   No   In the past 3 months, have you taken medications that affect  your immune system, such as prednisone, other steroids, or anticancer drugs; drugs for the treatment of rheumatoid arthritis, Crohn s disease, or psoriasis; or have you had radiation treatments?   No   Have you had a seizure, or a brain or other nervous system problem?   No   During the past year, have you received a transfusion of blood or blood    products, or been given immune (gamma) globulin or antiviral drug?   No   For women: Are you pregnant or is there a chance you could become       pregnant during the next month?   No   Have you received any vaccinations in the past 4 weeks?   No     Immunization questionnaire answers were all negative.      Patient instructed to remain in clinic for 15 minutes afterwards, and to report any adverse reactions.     Screening performed by Moy Garcia on 8/29/2024 at 7:53 AM.        Rx Refill Note  Requested Prescriptions     Pending Prescriptions Disp Refills   • lisinopril (PRINIVIL,ZESTRIL) 10 MG tablet [Pharmacy Med Name: LISINOPRIL 10 MG TABLET] 30 tablet 0     Sig: TAKE 1 TABLET BY MOUTH EVERY DAY      Last office visit with prescribing clinician: 1/18/2022      Next office visit with prescribing clinician: Visit date not found            Federico Craig MA  02/09/22, 14:33 EST

## 2024-09-03 RX ORDER — LABETALOL 200 MG/1
200 TABLET, FILM COATED ORAL 2 TIMES DAILY
Qty: 180 TABLET | Refills: 4 | OUTPATIENT
Start: 2024-09-03

## 2024-09-04 NOTE — TELEPHONE ENCOUNTER
Caller: Charlette Silver    Relationship: Self    Best call back number: [phone]     Requested Prescriptions:   Requested Prescriptions     Pending Prescriptions Disp Refills    labetalol (NORMODYNE) 200 MG tablet 180 tablet 4     Sig: Take 1 tablet by mouth 2 (Two) Times a Day.        Pharmacy where request should be sent: [facility] #[id number] - [place], [place] - [address] - [phone]  - [phone]     Last office visit with prescribing clinician: 7/20/2023   Last telemedicine visit with prescribing clinician: Visit date not found   Next office visit with prescribing clinician: 9/25/2024     Additional details provided by patient: REQUESTING EMERGENCY SCRIPT UNTIL VISIT    Does the patient have less than a 3 day supply:  [x] Yes  [] No      Gertrude Trimble Rep   09/04/24 09:50 EDT

## 2024-09-05 RX ORDER — LABETALOL 200 MG/1
200 TABLET, FILM COATED ORAL 2 TIMES DAILY
Qty: 180 TABLET | Refills: 4 | Status: SHIPPED | OUTPATIENT
Start: 2024-09-05

## 2024-09-05 NOTE — TELEPHONE ENCOUNTER
Caller: Charlette Silver    Relationship: Self    Best call back number: [phone]     Who are you requesting to speak with (clinical staff, provider,  specific staff member): CLINICAL STAFF    What was the call regarding: PATIENT IS CALLING TO FOLLOW UP ON MEDICATION REFILL REQUEST FROM 9/4.  PATIENT HAS BEEN WITHOUT THE MEDICATION SINCE MONDAY 9/2.  PATIENT ALSO WANTS THE DOCTOR TO BE AWARE THAT SHE HAS AN APPOINTMENT 9/25 TO BE SEEN.  PLEASE CALL TO FOLLOW UP IF NEEDED

## 2024-09-05 NOTE — TELEPHONE ENCOUNTER
Called patient and advised her to make an appointment patient stated that the earliest she could get scheduled is 09/25/2024 and she can't go that long without the blood pressure medication. Patient wants to know if it is possible for her to get a supply up until her appt.please review and advise

## 2024-09-25 ENCOUNTER — OFFICE VISIT (OUTPATIENT)
Dept: FAMILY MEDICINE CLINIC | Facility: CLINIC | Age: 59
End: 2024-09-25
Payer: COMMERCIAL

## 2024-09-25 VITALS
HEIGHT: 62 IN | BODY MASS INDEX: 27.42 KG/M2 | DIASTOLIC BLOOD PRESSURE: 80 MMHG | SYSTOLIC BLOOD PRESSURE: 120 MMHG | WEIGHT: 149 LBS

## 2024-09-25 DIAGNOSIS — Z13.0 SCREENING FOR DEFICIENCY ANEMIA: ICD-10-CM

## 2024-09-25 DIAGNOSIS — Z13.220 SCREENING FOR HYPERLIPIDEMIA: ICD-10-CM

## 2024-09-25 DIAGNOSIS — I10 BENIGN ESSENTIAL HTN: ICD-10-CM

## 2024-09-25 DIAGNOSIS — Z12.4 PAP SMEAR FOR CERVICAL CANCER SCREENING: Primary | ICD-10-CM

## 2024-09-25 DIAGNOSIS — R35.1 NOCTURIA: ICD-10-CM

## 2024-09-25 PROCEDURE — 99213 OFFICE O/P EST LOW 20 MIN: CPT | Performed by: INTERNAL MEDICINE

## 2024-10-02 LAB
ALBUMIN SERPL-MCNC: 3.6 G/DL (ref 3.5–5.2)
ALBUMIN/GLOB SERPL: 1.4 G/DL
ALP SERPL-CCNC: 89 U/L (ref 39–117)
ALT SERPL-CCNC: 10 U/L (ref 1–33)
AST SERPL-CCNC: 35 U/L (ref 1–32)
BASOPHILS # BLD AUTO: 0.06 10*3/MM3 (ref 0–0.2)
BASOPHILS NFR BLD AUTO: 1 % (ref 0–1.5)
BILIRUB SERPL-MCNC: 0.4 MG/DL (ref 0–1.2)
BUN SERPL-MCNC: 7 MG/DL (ref 8–23)
BUN/CREAT SERPL: 10.3 (ref 7–25)
CALCIUM SERPL-MCNC: 8.6 MG/DL (ref 8.6–10.5)
CHLORIDE SERPL-SCNC: 106 MMOL/L (ref 98–107)
CHOLEST SERPL-MCNC: 184 MG/DL (ref 0–200)
CHOLEST/HDLC SERPL: 4.72 {RATIO}
CO2 SERPL-SCNC: 22.9 MMOL/L (ref 22–29)
CREAT SERPL-MCNC: 0.68 MG/DL (ref 0.57–1)
EGFRCR SERPLBLD CKD-EPI 2021: 99.8 ML/MIN/1.73
EOSINOPHIL # BLD AUTO: 0.18 10*3/MM3 (ref 0–0.4)
EOSINOPHIL NFR BLD AUTO: 3.1 % (ref 0.3–6.2)
ERYTHROCYTE [DISTWIDTH] IN BLOOD BY AUTOMATED COUNT: 13.1 % (ref 12.3–15.4)
GLOBULIN SER CALC-MCNC: 2.6 GM/DL
GLUCOSE SERPL-MCNC: 97 MG/DL (ref 65–99)
HCT VFR BLD AUTO: 35.3 % (ref 34–46.6)
HDLC SERPL-MCNC: 39 MG/DL (ref 40–60)
HGB BLD-MCNC: 12.1 G/DL (ref 12–15.9)
IMM GRANULOCYTES # BLD AUTO: 0.03 10*3/MM3 (ref 0–0.05)
IMM GRANULOCYTES NFR BLD AUTO: 0.5 % (ref 0–0.5)
LDLC SERPL CALC-MCNC: 113 MG/DL (ref 0–100)
LYMPHOCYTES # BLD AUTO: 1.65 10*3/MM3 (ref 0.7–3.1)
LYMPHOCYTES NFR BLD AUTO: 28.6 % (ref 19.6–45.3)
MCH RBC QN AUTO: 35.7 PG (ref 26.6–33)
MCHC RBC AUTO-ENTMCNC: 34.3 G/DL (ref 31.5–35.7)
MCV RBC AUTO: 104.1 FL (ref 79–97)
MONOCYTES # BLD AUTO: 0.44 10*3/MM3 (ref 0.1–0.9)
MONOCYTES NFR BLD AUTO: 7.6 % (ref 5–12)
NEUTROPHILS # BLD AUTO: 3.4 10*3/MM3 (ref 1.7–7)
NEUTROPHILS NFR BLD AUTO: 59.2 % (ref 42.7–76)
NRBC BLD AUTO-RTO: 0 /100 WBC (ref 0–0.2)
PLATELET # BLD AUTO: 202 10*3/MM3 (ref 140–450)
POTASSIUM SERPL-SCNC: 2.9 MMOL/L (ref 3.5–5.2)
PROT SERPL-MCNC: 6.2 G/DL (ref 6–8.5)
RBC # BLD AUTO: 3.39 10*6/MM3 (ref 3.77–5.28)
SODIUM SERPL-SCNC: 142 MMOL/L (ref 136–145)
TRIGL SERPL-MCNC: 182 MG/DL (ref 0–150)
UNABLE TO VOID: NORMAL
VLDLC SERPL CALC-MCNC: 32 MG/DL (ref 5–40)
WBC # BLD AUTO: 5.76 10*3/MM3 (ref 3.4–10.8)

## 2024-10-03 RX ORDER — POTASSIUM CHLORIDE 750 MG/1
TABLET, EXTENDED RELEASE ORAL
Qty: 60 TABLET | Refills: 1 | Status: SHIPPED | OUTPATIENT
Start: 2024-10-03

## 2024-11-05 RX ORDER — POTASSIUM CHLORIDE 750 MG/1
TABLET, EXTENDED RELEASE ORAL
Qty: 180 TABLET | Refills: 3 | Status: SHIPPED | OUTPATIENT
Start: 2024-11-05

## 2025-03-10 RX ORDER — LABETALOL 200 MG/1
200 TABLET, FILM COATED ORAL 2 TIMES DAILY
Qty: 180 TABLET | Refills: 4 | Status: SHIPPED | OUTPATIENT
Start: 2025-03-10